# Patient Record
Sex: MALE | Race: BLACK OR AFRICAN AMERICAN | NOT HISPANIC OR LATINO | URBAN - METROPOLITAN AREA
[De-identification: names, ages, dates, MRNs, and addresses within clinical notes are randomized per-mention and may not be internally consistent; named-entity substitution may affect disease eponyms.]

---

## 2023-04-27 ENCOUNTER — NURSING HOME VISIT (OUTPATIENT)
Dept: PRIMARY CARE | Facility: CLINIC | Age: 23
End: 2023-04-27
Payer: COMMERCIAL

## 2023-04-27 DIAGNOSIS — Z98.890 HX OF PELVIC SURGERY: ICD-10-CM

## 2023-04-27 DIAGNOSIS — T14.90XA MAJOR TRAUMATIC INJURY: Primary | ICD-10-CM

## 2023-04-27 DIAGNOSIS — Z98.890 S/P BLADDER REPAIR: ICD-10-CM

## 2023-04-27 PROCEDURE — 99306 1ST NF CARE HIGH MDM 50: CPT | Performed by: EMERGENCY MEDICINE

## 2023-05-11 ENCOUNTER — NURSING HOME VISIT (OUTPATIENT)
Dept: POST ACUTE CARE | Facility: EXTERNAL LOCATION | Age: 23
End: 2023-05-11
Payer: COMMERCIAL

## 2023-05-11 DIAGNOSIS — T14.90XA MAJOR TRAUMATIC INJURY: Primary | ICD-10-CM

## 2023-05-11 DIAGNOSIS — Z98.890 S/P BLADDER REPAIR: ICD-10-CM

## 2023-05-11 DIAGNOSIS — Z98.890 H/O PELVIC SURGERY: ICD-10-CM

## 2023-05-11 PROCEDURE — 99309 SBSQ NF CARE MODERATE MDM 30: CPT | Performed by: EMERGENCY MEDICINE
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
## 2023-06-15 ENCOUNTER — NURSING HOME VISIT (OUTPATIENT)
Dept: POST ACUTE CARE | Facility: EXTERNAL LOCATION | Age: 23
End: 2023-06-15
Payer: COMMERCIAL

## 2023-06-15 DIAGNOSIS — Z98.890 H/O PELVIC SURGERY: ICD-10-CM

## 2023-06-15 DIAGNOSIS — Z98.890 S/P BLADDER REPAIR: Primary | ICD-10-CM

## 2023-06-15 DIAGNOSIS — T14.90XA MAJOR TRAUMATIC INJURY: ICD-10-CM

## 2023-06-15 PROCEDURE — 99308 SBSQ NF CARE LOW MDM 20: CPT | Performed by: EMERGENCY MEDICINE
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
## 2023-06-20 PROBLEM — T14.90XA: Status: ACTIVE | Noted: 2023-06-20

## 2023-06-20 PROBLEM — Z98.890 H/O PELVIC SURGERY: Status: ACTIVE | Noted: 2023-06-20

## 2023-06-20 PROBLEM — Z98.890 S/P BLADDER REPAIR: Status: ACTIVE | Noted: 2023-06-20
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
## 2023-07-11 ENCOUNTER — OFFICE VISIT (OUTPATIENT)
Dept: PRIMARY CARE | Facility: CLINIC | Age: 23
End: 2023-07-11
Payer: COMMERCIAL

## 2023-07-11 VITALS
SYSTOLIC BLOOD PRESSURE: 125 MMHG | BODY MASS INDEX: 22.47 KG/M2 | TEMPERATURE: 98.1 F | HEART RATE: 79 BPM | DIASTOLIC BLOOD PRESSURE: 72 MMHG | OXYGEN SATURATION: 99 % | WEIGHT: 175 LBS

## 2023-07-11 DIAGNOSIS — Z98.890 S/P BLADDER REPAIR: ICD-10-CM

## 2023-07-11 DIAGNOSIS — Z74.09 DECREASED AMBULATION STATUS: ICD-10-CM

## 2023-07-11 DIAGNOSIS — Z76.89 ENCOUNTER TO ESTABLISH CARE: Primary | ICD-10-CM

## 2023-07-11 PROBLEM — L85.3 DRY SKIN DERMATITIS: Status: ACTIVE | Noted: 2023-07-11

## 2023-07-11 PROBLEM — F81.9 LEARNING DISABILITIES: Status: ACTIVE | Noted: 2023-07-11

## 2023-07-11 PROBLEM — T14.90XA: Status: RESOLVED | Noted: 2023-06-20 | Resolved: 2023-07-11

## 2023-07-11 PROBLEM — E55.9 VITAMIN D DEFICIENCY: Status: ACTIVE | Noted: 2023-07-11

## 2023-07-11 PROCEDURE — 99203 OFFICE O/P NEW LOW 30 MIN: CPT | Performed by: STUDENT IN AN ORGANIZED HEALTH CARE EDUCATION/TRAINING PROGRAM

## 2023-07-11 RX ORDER — METHOCARBAMOL 500 MG/1
500 TABLET, FILM COATED ORAL 4 TIMES DAILY
COMMUNITY

## 2023-07-11 RX ORDER — FEEDER CONTAINER WITH PUMP SET
1 EACH MISCELLANEOUS 3 TIMES DAILY
Qty: 330 ML | Refills: 3 | Status: SHIPPED | OUTPATIENT
Start: 2023-07-11

## 2023-07-11 ASSESSMENT — PAIN SCALES - GENERAL: PAINLEVEL: 0-NO PAIN
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
## 2023-07-13 ENCOUNTER — TELEPHONE (OUTPATIENT)
Dept: PRIMARY CARE | Facility: CLINIC | Age: 23
End: 2023-07-13
Payer: COMMERCIAL

## 2023-09-08 PROBLEM — L21.9 SEBORRHEA: Status: ACTIVE | Noted: 2023-09-08

## 2023-09-08 PROBLEM — Z98.890 EXPLORATORY LAPAROTOMY SCAR: Status: ACTIVE | Noted: 2023-09-08

## 2023-09-08 PROBLEM — S32.9XXA PELVIS FRACTURE (MULTI): Status: ACTIVE | Noted: 2023-09-08

## 2023-09-08 PROBLEM — L90.5 EXPLORATORY LAPAROTOMY SCAR: Status: ACTIVE | Noted: 2023-09-08

## 2023-09-08 PROBLEM — S37.20XA BLADDER INJURY: Status: ACTIVE | Noted: 2023-09-08

## 2023-09-08 RX ORDER — ACETAMINOPHEN 325 MG/1
TABLET ORAL
COMMUNITY

## 2023-09-08 RX ORDER — ENOXAPARIN SODIUM 100 MG/ML
INJECTION SUBCUTANEOUS
COMMUNITY

## 2023-09-08 RX ORDER — MELATONIN 10 MG
CAPSULE ORAL
COMMUNITY

## 2023-09-08 RX ORDER — ERGOCALCIFEROL 1.25 MG/1
CAPSULE ORAL
COMMUNITY

## 2023-09-08 RX ORDER — VITAMIN E/VITAMINS A AND D
CREAM (GRAM) TOPICAL
COMMUNITY

## 2023-09-08 RX ORDER — IBUPROFEN 200 MG
TABLET ORAL
COMMUNITY

## 2023-09-08 RX ORDER — ONDANSETRON 4 MG/1
TABLET, ORALLY DISINTEGRATING ORAL
COMMUNITY

## 2023-09-08 RX ORDER — BACITRACIN ZINC 500 UNIT/G
OINTMENT (GRAM) TOPICAL
COMMUNITY

## 2023-09-08 RX ORDER — SENNOSIDES 8.6 MG/1
1 TABLET ORAL DAILY
COMMUNITY

## 2023-09-08 RX ORDER — OXYBUTYNIN CHLORIDE 5 MG/1
TABLET ORAL
COMMUNITY

## 2023-10-12 ENCOUNTER — APPOINTMENT (OUTPATIENT)
Dept: ORTHOPEDIC SURGERY | Facility: HOSPITAL | Age: 23
End: 2023-10-12
Payer: COMMERCIAL

## 2023-11-01 ENCOUNTER — OFFICE VISIT (OUTPATIENT)
Dept: ORTHOPEDIC SURGERY | Facility: HOSPITAL | Age: 23
End: 2023-11-01

## 2023-11-01 ENCOUNTER — HOSPITAL ENCOUNTER (OUTPATIENT)
Dept: RADIOLOGY | Facility: HOSPITAL | Age: 23
Discharge: HOME | End: 2023-11-01

## 2023-11-01 VITALS — BODY MASS INDEX: 22.66 KG/M2 | WEIGHT: 171 LBS | HEIGHT: 73 IN

## 2023-11-01 DIAGNOSIS — Z87.81 HISTORY OF PELVIC FRACTURE: ICD-10-CM

## 2023-11-01 PROCEDURE — 72190 X-RAY EXAM OF PELVIS: CPT | Mod: FY

## 2023-11-01 PROCEDURE — 1036F TOBACCO NON-USER: CPT | Performed by: ORTHOPAEDIC SURGERY

## 2023-11-01 PROCEDURE — 72190 X-RAY EXAM OF PELVIS: CPT | Performed by: STUDENT IN AN ORGANIZED HEALTH CARE EDUCATION/TRAINING PROGRAM

## 2023-11-01 PROCEDURE — 99213 OFFICE O/P EST LOW 20 MIN: CPT | Performed by: ORTHOPAEDIC SURGERY

## 2023-11-01 PROCEDURE — 99213 OFFICE O/P EST LOW 20 MIN: CPT | Mod: 25 | Performed by: ORTHOPAEDIC SURGERY

## 2023-11-01 ASSESSMENT — PAIN - FUNCTIONAL ASSESSMENT: PAIN_FUNCTIONAL_ASSESSMENT: NO/DENIES PAIN

## 2023-11-01 NOTE — PROGRESS NOTES
Favian Ramirez is  post-op from anterior Ex-Fix and posterior CRPP left sacrum on 3/31/2023 and removal of anterior Ex-Fix and retrograde rami screws on 4/3/2023.  he is doing well at this point.  Pain is well controlled  Denies fevers or chills.  Denies drainage from the wound.  he reports no additional symptoms or concerns. No shortness of breath or chest pain. No calf swelling or pain.    The patients full medical history, surgical history, medications, allergies, family, medical history, social history, and a complete 14 point review of systems is documented in the medical record on the signed, scanned medical intake sheet or reviewed in the history of present illness. Review of systems otherwise negative    No past medical history on file.    Medication Documentation Review Audit       Reviewed by Moustapha Ley MD (Physician) on 07/11/23 at 0941      Medication Order Taking? Sig Documenting Provider Last Dose Status            No Medications to Display                                   No Known Allergies    Social History     Socioeconomic History    Marital status: Single     Spouse name: Not on file    Number of children: Not on file    Years of education: Not on file    Highest education level: Not on file   Occupational History    Not on file   Tobacco Use    Smoking status: Every Day     Types: Cigarettes    Smokeless tobacco: Never   Substance and Sexual Activity    Alcohol use: Not Currently    Drug use: Never    Sexual activity: Not on file   Other Topics Concern    Not on file   Social History Narrative    Not on file     Social Determinants of Health     Financial Resource Strain: Not on file   Food Insecurity: Not on file   Transportation Needs: Not on file   Physical Activity: Not on file   Stress: Not on file   Social Connections: Not on file   Intimate Partner Violence: Not on file   Housing Stability: Not on file       Past Surgical History:   Procedure Laterality Date    CT ABDOMEN PELVIS  ANGIOGRAM W AND/OR WO IV CONTRAST  4/2/2023    CT ABDOMEN PELVIS ANGIOGRAM W AND/OR WO IV CONTRAST [facility] CT       Gen: The patient is alert and oriented ×3, is in no acute distress, and appear their stated age and weight.    Psychiatric: Mood and affect are appropriate.    Eyes: Sclera are white, and pupils are round and symmetric.    ENT: Mucous membranes are moist.     Neck: Supple. Thyroid is midline.    Respiratory: Respirations are nonlabored, chest rise is symmetric.    Cardiac: Rate is regular by palpation of distal pulses.     Abdomen: Nondistended.    Integument: No obvious cutaneous lesions are noted. No signs of lymphangitis. No signs of systemic edema.  side: bilateral lower extremity :  his  surgical incisions are healing well, without evidence of erythema, fluctuance, drainage, or infection.  The skin around the incision is intact.  Distally neurovascular exam is stable.  There is appropriate tenderness to palpation in the rahat-incisional area. No calf swelling or tenderness to palpation.      I personally reviewed multiple views of pelvis were obtained in the office today demonstrate maintenance of reduction, interval healing, and a stable position of the hardware.      Favian Ramirez is a 23 y.o. male patient status post anterior Ex-Fix and posterior CRPP left sacrum on 3/31/2023 and removal of anterior Ex-Fix and retrograde rami screws on 4/3/2023.  Of note he had a bladder injury and ex lap and was unstable during his first surgery was 5 used over tested Tatian screw and external fixation.   I went over his x-rays in detail today.   he is WBAT of the side: bilateral lower extremity. ~He/she~ is range of motion as tolerated of the side: bilateral lower extremity.  I stressed the importance of physical therapy on overall functional outcome. I answered all patient's questions he agrees with treatment plan.  I will see him back in Follow-up 6 month(s)with repeat 5 views of the pelvis including AP inlet  mahi and judgabriel.        Livan Wilson    Department of Orthopaedic Trauma Surgery